# Patient Record
(demographics unavailable — no encounter records)

---

## 2025-05-15 NOTE — HISTORY OF PRESENT ILLNESS
[FreeTextEntry1] : 31  presenting for initial visit and discussion of tubal reanastamosis. Pt with tubal ligation in [facility] during C/s for last child 10 years ago. Pt does not have any medical records or documentation from procedure. Pt has 3 healthy children but desires more children. Denies additional complaints. Has been receiving care with [facility]. Had an annual exam with them last month and was referred here for fertility planning.   PMH: Denies PSH: C/S X3 last C/s with tubal ligation 10 years ago, Gastric sleeve, Cholecystectomy Meds: Vitamins  Allx: NKDA Pob: P3 C/s x3 first c/s for LGA  PGyn: Denies fibroids, cysts, abnormal paps- [facility]

## 2025-05-15 NOTE — PLAN
[FreeTextEntry1] : 31  presenting for initial visit and discussion of tubal Reanastamosis. Discussed with patient the increased risk of ectopic pregnancy with tubal reanastamosis and decreased chances of spontaneous pregnancy. Discussed IVF as an alternative method of conception and patient interested in discussing further options with [facility].  -[facility] referral  -[facility] - Pt to send records to us and bring records to [facility] appt -F/u in 3 months  Karie Daniels, PGY1 Discussed with Dr. Escalante